# Patient Record
Sex: FEMALE
[De-identification: names, ages, dates, MRNs, and addresses within clinical notes are randomized per-mention and may not be internally consistent; named-entity substitution may affect disease eponyms.]

---

## 2024-07-30 ENCOUNTER — NON-APPOINTMENT (OUTPATIENT)
Age: 19
End: 2024-07-30

## 2024-07-30 ENCOUNTER — APPOINTMENT (OUTPATIENT)
Dept: OPHTHALMOLOGY | Facility: CLINIC | Age: 19
End: 2024-07-30
Payer: COMMERCIAL

## 2024-07-30 PROCEDURE — 92015 DETERMINE REFRACTIVE STATE: CPT

## 2024-07-30 PROCEDURE — 92004 COMPRE OPH EXAM NEW PT 1/>: CPT

## 2025-07-29 ENCOUNTER — EMERGENCY (EMERGENCY)
Facility: HOSPITAL | Age: 20
LOS: 1 days | End: 2025-07-29
Attending: EMERGENCY MEDICINE
Payer: MEDICAID

## 2025-07-29 VITALS
TEMPERATURE: 98 F | OXYGEN SATURATION: 99 % | HEART RATE: 98 BPM | RESPIRATION RATE: 15 BRPM | DIASTOLIC BLOOD PRESSURE: 68 MMHG | SYSTOLIC BLOOD PRESSURE: 90 MMHG

## 2025-07-29 VITALS
OXYGEN SATURATION: 98 % | TEMPERATURE: 98 F | HEIGHT: 60 IN | DIASTOLIC BLOOD PRESSURE: 79 MMHG | SYSTOLIC BLOOD PRESSURE: 118 MMHG | RESPIRATION RATE: 17 BRPM | WEIGHT: 89.95 LBS | HEART RATE: 95 BPM

## 2025-07-29 LAB
ALBUMIN SERPL ELPH-MCNC: 4.5 G/DL — SIGNIFICANT CHANGE UP (ref 3.3–5)
ALP SERPL-CCNC: 62 U/L — SIGNIFICANT CHANGE UP (ref 40–120)
ALT FLD-CCNC: 16 U/L — SIGNIFICANT CHANGE UP (ref 10–45)
ANION GAP SERPL CALC-SCNC: 14 MMOL/L — SIGNIFICANT CHANGE UP (ref 5–17)
APPEARANCE UR: CLEAR — SIGNIFICANT CHANGE UP
AST SERPL-CCNC: 21 U/L — SIGNIFICANT CHANGE UP (ref 10–40)
BASOPHILS # BLD AUTO: 0.03 K/UL — SIGNIFICANT CHANGE UP (ref 0–0.2)
BASOPHILS NFR BLD AUTO: 0.3 % — SIGNIFICANT CHANGE UP (ref 0–2)
BILIRUB SERPL-MCNC: 0.4 MG/DL — SIGNIFICANT CHANGE UP (ref 0.2–1.2)
BILIRUB UR-MCNC: NEGATIVE — SIGNIFICANT CHANGE UP
BUN SERPL-MCNC: 8 MG/DL — SIGNIFICANT CHANGE UP (ref 7–23)
CALCIUM SERPL-MCNC: 9.5 MG/DL — SIGNIFICANT CHANGE UP (ref 8.4–10.5)
CHLORIDE SERPL-SCNC: 103 MMOL/L — SIGNIFICANT CHANGE UP (ref 96–108)
CO2 SERPL-SCNC: 20 MMOL/L — LOW (ref 22–31)
COLOR SPEC: YELLOW — SIGNIFICANT CHANGE UP
CREAT SERPL-MCNC: 0.48 MG/DL — LOW (ref 0.5–1.3)
DIFF PNL FLD: NEGATIVE — SIGNIFICANT CHANGE UP
EGFR: 139 ML/MIN/1.73M2 — SIGNIFICANT CHANGE UP
EGFR: 139 ML/MIN/1.73M2 — SIGNIFICANT CHANGE UP
EOSINOPHIL # BLD AUTO: 0.12 K/UL — SIGNIFICANT CHANGE UP (ref 0–0.5)
EOSINOPHIL NFR BLD AUTO: 1.1 % — SIGNIFICANT CHANGE UP (ref 0–6)
GLUCOSE SERPL-MCNC: 90 MG/DL — SIGNIFICANT CHANGE UP (ref 70–99)
GLUCOSE UR QL: NEGATIVE MG/DL — SIGNIFICANT CHANGE UP
HCG SERPL-ACNC: HIGH MIU/ML
HCT VFR BLD CALC: 39.1 % — SIGNIFICANT CHANGE UP (ref 34.5–45)
HGB BLD-MCNC: 13.4 G/DL — SIGNIFICANT CHANGE UP (ref 11.5–15.5)
IMM GRANULOCYTES # BLD AUTO: 0.05 K/UL — SIGNIFICANT CHANGE UP (ref 0–0.07)
IMM GRANULOCYTES NFR BLD AUTO: 0.4 % — SIGNIFICANT CHANGE UP (ref 0–0.9)
KETONES UR QL: NEGATIVE MG/DL — SIGNIFICANT CHANGE UP
LEUKOCYTE ESTERASE UR-ACNC: NEGATIVE — SIGNIFICANT CHANGE UP
LYMPHOCYTES # BLD AUTO: 0.67 K/UL — LOW (ref 1–3.3)
LYMPHOCYTES NFR BLD AUTO: 5.9 % — LOW (ref 13–44)
MAGNESIUM SERPL-MCNC: 1.9 MG/DL — SIGNIFICANT CHANGE UP (ref 1.6–2.6)
MCHC RBC-ENTMCNC: 29.5 PG — SIGNIFICANT CHANGE UP (ref 27–34)
MCHC RBC-ENTMCNC: 34.3 G/DL — SIGNIFICANT CHANGE UP (ref 32–36)
MCV RBC AUTO: 86.1 FL — SIGNIFICANT CHANGE UP (ref 80–100)
MONOCYTES # BLD AUTO: 0.58 K/UL — SIGNIFICANT CHANGE UP (ref 0–0.9)
MONOCYTES NFR BLD AUTO: 5.1 % — SIGNIFICANT CHANGE UP (ref 2–14)
NEUTROPHILS # BLD AUTO: 9.89 K/UL — HIGH (ref 1.8–7.4)
NEUTROPHILS NFR BLD AUTO: 87.2 % — HIGH (ref 43–77)
NITRITE UR-MCNC: NEGATIVE — SIGNIFICANT CHANGE UP
NRBC # BLD AUTO: 0 K/UL — SIGNIFICANT CHANGE UP (ref 0–0)
NRBC # FLD: 0 K/UL — SIGNIFICANT CHANGE UP (ref 0–0)
NRBC BLD AUTO-RTO: 0 /100 WBCS — SIGNIFICANT CHANGE UP (ref 0–0)
PH UR: 7.5 — SIGNIFICANT CHANGE UP (ref 5–8)
PHOSPHATE SERPL-MCNC: 3.7 MG/DL — SIGNIFICANT CHANGE UP (ref 2.5–4.5)
PLATELET # BLD AUTO: 228 K/UL — SIGNIFICANT CHANGE UP (ref 150–400)
PMV BLD: 10.3 FL — SIGNIFICANT CHANGE UP (ref 7–13)
POTASSIUM SERPL-MCNC: 4 MMOL/L — SIGNIFICANT CHANGE UP (ref 3.5–5.3)
POTASSIUM SERPL-SCNC: 4 MMOL/L — SIGNIFICANT CHANGE UP (ref 3.5–5.3)
PROT SERPL-MCNC: 7.7 G/DL — SIGNIFICANT CHANGE UP (ref 6–8.3)
PROT UR-MCNC: NEGATIVE MG/DL — SIGNIFICANT CHANGE UP
RBC # BLD: 4.54 M/UL — SIGNIFICANT CHANGE UP (ref 3.8–5.2)
RBC # FLD: 11.9 % — SIGNIFICANT CHANGE UP (ref 10.3–14.5)
SODIUM SERPL-SCNC: 137 MMOL/L — SIGNIFICANT CHANGE UP (ref 135–145)
SP GR SPEC: 1.01 — SIGNIFICANT CHANGE UP (ref 1–1.03)
UROBILINOGEN FLD QL: 0.2 MG/DL — SIGNIFICANT CHANGE UP (ref 0.2–1)
WBC # BLD: 11.34 K/UL — HIGH (ref 3.8–10.5)
WBC # FLD AUTO: 11.34 K/UL — HIGH (ref 3.8–10.5)

## 2025-07-29 PROCEDURE — 99284 EMERGENCY DEPT VISIT MOD MDM: CPT | Mod: 25

## 2025-07-29 PROCEDURE — 76815 OB US LIMITED FETUS(S): CPT

## 2025-07-29 PROCEDURE — 81003 URINALYSIS AUTO W/O SCOPE: CPT

## 2025-07-29 PROCEDURE — 99285 EMERGENCY DEPT VISIT HI MDM: CPT

## 2025-07-29 PROCEDURE — 83735 ASSAY OF MAGNESIUM: CPT

## 2025-07-29 PROCEDURE — 84100 ASSAY OF PHOSPHORUS: CPT

## 2025-07-29 PROCEDURE — 83690 ASSAY OF LIPASE: CPT

## 2025-07-29 PROCEDURE — 96374 THER/PROPH/DIAG INJ IV PUSH: CPT

## 2025-07-29 PROCEDURE — 80053 COMPREHEN METABOLIC PANEL: CPT

## 2025-07-29 PROCEDURE — 84702 CHORIONIC GONADOTROPIN TEST: CPT

## 2025-07-29 PROCEDURE — 85025 COMPLETE CBC W/AUTO DIFF WBC: CPT

## 2025-07-29 PROCEDURE — 76815 OB US LIMITED FETUS(S): CPT | Mod: 26

## 2025-07-29 RX ORDER — DOXYLAMINE SUCCINATE AND PYRIDOXINE HYDROCHLORIDE 10; 10 MG/1; MG/1
2 TABLET, DELAYED RELEASE ORAL
Refills: 0
Start: 2025-07-29

## 2025-07-29 RX ORDER — ONDANSETRON HCL/PF 4 MG/2 ML
1 VIAL (ML) INJECTION
Qty: 15 | Refills: 0
Start: 2025-07-29 | End: 2025-08-02

## 2025-07-29 RX ORDER — ONDANSETRON HCL/PF 4 MG/2 ML
4 VIAL (ML) INJECTION ONCE
Refills: 0 | Status: COMPLETED | OUTPATIENT
Start: 2025-07-29 | End: 2025-07-29

## 2025-07-29 RX ORDER — DOXYLAMINE SUCCINATE AND PYRIDOXINE HYDROCHLORIDE 10; 10 MG/1; MG/1
1 TABLET, DELAYED RELEASE ORAL
Qty: 7 | Refills: 0
Start: 2025-07-29 | End: 2025-08-02

## 2025-07-29 RX ADMIN — Medication 4 MILLIGRAM(S): at 11:40

## 2025-07-29 RX ADMIN — Medication 1000 MILLILITER(S): at 09:38

## 2025-07-29 NOTE — ED PROVIDER NOTE - OBJECTIVE STATEMENT
pt is 18 yo female no PMHx/PSHx present to the ED becaue of vomiting. Pt is currently 13 weeks pregnant, been vomiting and nauseous since week 9. Symptoms have be increasing in frequency, last vomited 20 minutes ago, endorsed a episode of bilious vomiting yesterday. Her OBGYN recommend her to come in for IV hydration. Denies vagina bleeding, CP, SOB, palpations, dizziness, abdominal pain, pt is 20 yo female no PMHx/PSHx present to the ED luise of vomiting. Pt is currently 13 weeks pregnant, been vomiting and nauseous since week 9. Symptoms have be increasing in frequency, last vomited 20 minutes ago, endorsed a episode of bilious vomiting yesterday. Her OBGYN recommend her to come in for IV hydration. Denies vagina bleeding, fever, dizziness, CP, SOB, palpations, dizziness, abdominal pain, bloody/dark stool, constipation, diarrhea, hematuria.

## 2025-07-29 NOTE — ED PROVIDER NOTE - PHYSICAL EXAMINATION
General: AOx3, NAD, well developed/nourished, no pallor or diaphoresis, not toxic appearing, ambulates   HEENT: normocephalic atraumatic, EMOI, PERRLA, normal conjunctiva, non-icterus, moist mucus membrane, uvula midline without oral pharyngeal erythema, exudate or swelling  Respiratory: CTA b/l, no wheezing/rales/rhonchi/stridor, normal work of breathing, speaks in full sentences   Cardiac: S1 S2, regular rate and rhythm, no murmurs rubs or gallops, UE/LE pulses 2+, no edema   Abdominal: soft, non-tender/non-distended, (-) Dang, no guarding/rebound tenderness, BSx4  MSK: normal ROM, no obvious deformities  Psych: normal mood and affect  Ultrasound: fetus in the uterus

## 2025-07-29 NOTE — ED ADULT NURSE NOTE - NSFALLUNIVINTERV_ED_ALL_ED
Bed/Stretcher in lowest position, wheels locked, appropriate side rails in place/Call bell, personal items and telephone in reach/Instruct patient to call for assistance before getting out of bed/chair/stretcher/Non-slip footwear applied when patient is off stretcher/[place] to call system/Physically safe environment - no spills, clutter or unnecessary equipment/Purposeful proactive rounding/Room/bathroom lighting operational, light cord in reach

## 2025-07-29 NOTE — ED PROVIDER NOTE - PATIENT PORTAL LINK FT
You can access the FollowMyHealth Patient Portal offered by [facility] by registering at the following website: [email or web address]. By joining [facility]’s FollowMyHealth portal, you will also be able to view your health information using other applications (apps) compatible with our system.

## 2025-07-29 NOTE — ED PROVIDER NOTE - CLINICAL SUMMARY MEDICAL DECISION MAKING FREE TEXT BOX
pt is 20 yo female no PMHx/PSHx present to the ED because of vomiting. she is currently 13 weeks pregnant, last vomited 20 minutes ago, mostly liquid had 1 episode of bilious vomiting days ago, not tolerating PO. Denies dizziness, vagina bleeding, abdominal pain, burning urination. Her OBGYN told her to get IV at the ED. Ultrasound showed fetus in the uterus. Rest of physical exam is unremarkable or as indicted in PE section. likely hyperemesis gravidarum, less likely UTI, ovarian torsion. Will order cbc, cmp, UA, b-hcg, magnesium, phosphorous, IV fluids, zofran.

## 2025-07-29 NOTE — ED PROVIDER NOTE - ATTENDING CONTRIBUTION TO CARE
Patient well appearing female , 13 weeks by US p/w hyperemesis gravidarum.  Reports nausea/vomiting since 9 weeks, acutely worse in the last 2-3 days.  Denies fevers, abdominal pain, cramping, vaginal bleeding, spotting, discharge, or dysuria.  Her gyn is aware, wants her to have IVF. Bedside exam remarkable for dry mucus membranes, abdomen is benign, bedside US fetal with normal IUP, FHR est 140's.     Plan:  - Check labs, UA  - IVF hydration  - PO challenge  - DC with antiemetics if tolerating PO - does not have at home. Patient well appearing female , 13 weeks by US p/w hyperemesis gravidarum.  Reports nausea/vomiting since 9 weeks, acutely worse in the last 2-3 days.  Denies fevers, abdominal pain, cramping, vaginal bleeding, spotting, discharge, or dysuria.  Her gyn is aware, wants her to have IVF. Bedside exam remarkable for dry mucus membranes, abdomen is benign, bedside US fetal with normal IUP, FHR est 140's.     Plan:  - Check labs, UA  - IVF hydration  - PO challenge  - DC with antiemetics if tolerating PO - does not have at home.    DC note:  Lab values reviewed, there are no values which require acute intervention.  UA negative.  Passed PO challenge.  Will dc with diclegis and zofran, patient to follow up with her OB, given return precautions.

## 2025-07-29 NOTE — ED ADULT NURSE NOTE - OBJECTIVE STATEMENT
20 y/o female coming to the ER with c/o nausea and vomiting. A&Ox4. Ambulatory. No significant PMH. Patient endorses her LMP was  and she is 13 weeks pregnant confirmed IUP. .  Patient states she has had intermittent nausea throughout the pregnancy, worse over the last 3 days. Patient states she has been unable to keep food down and endorses bilious emesis, last episode this morning. Abdomen is soft, non distended, non tender. Patient denies chest pain, fever, chills, n/v/d, urinary symptoms, abdominal pain. Safety measures maintained. Bed in the lowest position. Provider at the bedside. No acute distress noted or further complaints at this time.

## 2025-07-29 NOTE — ED PROVIDER NOTE - PROGRESS NOTE DETAILS
patient said she is not currently nauseous, does not want Zofran right now. Agree to it if she continues to be nauseous.   Rafy Mullins PGY1 EM patient is currently tolerating some crackers and juice in the ED. she does not feel nauseous.   Rafy Mullins PGY1 EM

## 2025-07-29 NOTE — ED PROVIDER NOTE - NSFOLLOWUPINSTRUCTIONS_ED_ALL_ED_FT
You came into the hospital today because of Nausea and Vomiting. In the ED you were treated with IV fluids and medication to help with nausea. We also did labs and the results were normal.     Hyperemesis gravidarum is a condition during pregnancy that causes severe nausea and vomiting, often leading to dehydration, weight loss, and electrolyte imbalances.     1. TAKE ALL MEDICATIONS AS DIRECTED.      2. Eat small, frequent meals, avoid greasy meals, stay hydrated with water.      3. Rest as needed, avoid overexertion, resume light activities as tolerated    4. FOLLOW UP WITH YOUR PRIMARY DOCTOR and OB-GYN WITHIN 24-72 HOURS.     5. IF YOU HAD LABS OR IMAGING DONE, YOU WERE GIVEN COPIES OF ALL LABS AND/OR IMAGING RESULTS FROM YOUR ER VISIT--PLEASE TAKE THEM WITH YOU TO YOUR FOLLOW UP APPOINTMENTS.    6. IF NEEDED, CALL PATIENT ACCESS SERVICES AT [phone] TO FIND A PRIMARY CARE PHYSICIAN.  OR CALL [phone] TO MAKE AN APPOINTMENT WITH THE CLINIC.    7. RETURN TO THE ER FOR ANY WORSENING SYMPTOMS OR CONCERNS. Symptoms to look out for but not limited: continue inability to keep food or liquid, signs of dehydration, abdominal pain/cramps, inability to urinate, fevers or chills.

## 2025-07-29 NOTE — ED ADULT TRIAGE NOTE - CHIEF COMPLAINT QUOTE
13 weeks pregnant. Pt has been vomiting since around 9 weeks of pregnancy that worsened last night.   Denies vaginal bleeding